# Patient Record
Sex: FEMALE | Race: WHITE | Employment: STUDENT | URBAN - METROPOLITAN AREA
[De-identification: names, ages, dates, MRNs, and addresses within clinical notes are randomized per-mention and may not be internally consistent; named-entity substitution may affect disease eponyms.]

---

## 2019-04-25 ENCOUNTER — HOSPITAL ENCOUNTER (EMERGENCY)
Age: 19
Discharge: HOME | End: 2019-04-25
Attending: EMERGENCY MEDICINE
Payer: COMMERCIAL

## 2019-04-25 ENCOUNTER — APPOINTMENT (OUTPATIENT)
Dept: CT IMAGING | Age: 19
End: 2019-04-25
Payer: COMMERCIAL

## 2019-04-25 ENCOUNTER — APPOINTMENT (OUTPATIENT)
Dept: GENERAL RADIOLOGY | Age: 19
End: 2019-04-25
Payer: COMMERCIAL

## 2019-04-25 VITALS
DIASTOLIC BLOOD PRESSURE: 58 MMHG | HEART RATE: 76 BPM | TEMPERATURE: 98.2 F | OXYGEN SATURATION: 95 % | SYSTOLIC BLOOD PRESSURE: 97 MMHG | RESPIRATION RATE: 18 BRPM

## 2019-04-25 DIAGNOSIS — R05.9 COUGH: Primary | ICD-10-CM

## 2019-04-25 LAB — HCG QUALITATIVE: NEGATIVE

## 2019-04-25 PROCEDURE — 84703 CHORIONIC GONADOTROPIN ASSAY: CPT

## 2019-04-25 PROCEDURE — 6370000000 HC RX 637 (ALT 250 FOR IP): Performed by: EMERGENCY MEDICINE

## 2019-04-25 PROCEDURE — 71046 X-RAY EXAM CHEST 2 VIEWS: CPT

## 2019-04-25 PROCEDURE — 99284 EMERGENCY DEPT VISIT MOD MDM: CPT

## 2019-04-25 PROCEDURE — 71250 CT THORAX DX C-: CPT

## 2019-04-25 RX ORDER — BENZONATATE 100 MG/1
100 CAPSULE ORAL 3 TIMES DAILY PRN
Qty: 30 CAPSULE | Refills: 0 | Status: SHIPPED | OUTPATIENT
Start: 2019-04-25 | End: 2019-05-02

## 2019-04-25 RX ORDER — BENZONATATE 100 MG/1
100 CAPSULE ORAL ONCE
Status: COMPLETED | OUTPATIENT
Start: 2019-04-25 | End: 2019-04-25

## 2019-04-25 RX ADMIN — BENZONATATE 100 MG: 100 CAPSULE ORAL at 01:07

## 2019-04-25 ASSESSMENT — ENCOUNTER SYMPTOMS
NAUSEA: 1
DIARRHEA: 0
FACIAL SWELLING: 0
SHORTNESS OF BREATH: 0
COUGH: 1
SORE THROAT: 0
WHEEZING: 0
EYE PAIN: 0
CONSTIPATION: 0
BACK PAIN: 0
ABDOMINAL PAIN: 0
VOMITING: 1

## 2019-04-25 NOTE — ED PROVIDER NOTES
ED Attending Attestation Note     Date of evaluation: 2019    This patient was seen by the resident. I have seen and examined the patient, agree with the workup, evaluation, management and diagnosis. The care plan has been discussed. My assessment reveals complaints of cough. Patient is unclear as to the duration, stating to the resident that it started 2 days ago and stated to me that she had coughing 3 weeks ago. Patient did have recent travel to [place] and family was concerned for [facility] fever (another family member  from this). Patient is afebrile on arrival with clear breath sounds. Chest x-ray showed possible nodule in the left upper lobe but this was not noted on CT scan of the chest.  Laboratory testing for Coccidioides antibodies were sent and are pending. Patient's symptoms resolved with Tessalon Perles. Patient does not currently have a primary care provider so will be referred to the [facility] resident clinic for follow-up of this testing. Clinical impression:  1.   Cough     Anette King MD  [phone]

## 2019-04-25 NOTE — ED TRIAGE NOTES
Patient is a 22year old female who presents to the ED from home with c/o cough and blood streaks in mucous. Patient was recently in [place] in the [facility]. Patient is alert and oriented with even and unlabored respirations.

## 2019-04-25 NOTE — LETTER
[facility] Emergency Department  [facility] [address] [address]  Phone: [phone]  Fax: [phone]          April 25, 2019     Patient: Lacey Nicholson   YOB: 2000   Date of Visit: 4/25/2019       To Whom It May Concern: It is my medical opinion that Lacey Nicholson should remain out of school until Friday April 26th. If you have any questions or concerns, please don't hesitate to call.     Sincerely,

## 2019-04-25 NOTE — ED PROVIDER NOTES
Date ofevaluation: 4/25/2019    Chief Complaint   Cough      Nursing Notes, Past Medical Hx, Past Surgical Hx, Social Hx, Allergies, and Family Hx were reviewed. History of Present Illness     Flavia Greenberg is a 22 y.o. female with no significant PMHx who presents with cough. Patient states she developed a mild cough 2 days ago, but became much worse this evening to the point that she could not sleep. Her mother states that she felt warm but did not take her temperature. She was empirically treated with ibuprofen. Patient states that she began coughing up some blood streaked sputum this evening as well. She did have a recent travel to [place] for a music concert and mother is also concerned that she may have [address] fever as they had a family member who passed away from this. No leg swelling or calf pains. Did have 1 episode of vomiting prior to coming in. Of note, patient does have a history of allergies, family recently got a new dog. Her friend also had a cough while on the trip. Review of Systems     Review of Systems   Constitutional: Negative for activity change, appetite change, chills and fever. HENT: Negative for congestion, facial swelling and sore throat. Eyes: Negative for pain. Respiratory: Positive for cough. Negative for shortness of breath and wheezing. Cardiovascular: Negative for chest pain. Gastrointestinal: Positive for nausea and vomiting. Negative for abdominal pain, constipation and diarrhea. Genitourinary: Negative for dysuria. Musculoskeletal: Negative for back pain and neck pain. Skin: Negative for rash. Neurological: Negative for dizziness and weakness. All other systems reviewed and are negative. Past Medical, Surgical, Family, and Social History     History reviewed. No pertinent past medical history. History reviewed. No pertinent surgical history. Her family history is not on file. She reports that she has never smoked.  She has never of the defined types were placed in this encounter. CONSULTS:  None    MEDICAL DECISION MAKING     Guevara Cardona is a 22 y.o. female presenting with cough. Patient hemodynamically stable with normal oxygen saturation. Able to speak in complete sentences without any difficulty. Lungs were clear to auscultation. Given family's concern, chest x-ray was ordered that did show a nodular opacity either in the chest wall or in the left mid lung. I had a long discussion with the patient and her mother regarding follow-up for this. After shared decision making, they elected to have the CT performed today in the emergency department. We also offered serum testing for coccidioides. Although given her risk factor profile she would likely fall into non-treat category, yet this would help with follow up if her symptoms persist.     Of note she was treated with tessalon perles in the emergency department with significant improvement in her cough. This patient was also evaluated by the attending physician. All care plans were discussed and agreedupon. At this time, I am going off service and the results of the CT scan will be followed up by my attending, Dr. Carole Mendoza.      Clinical Impression     Cough    Disposition/Plan       DISPOSITION : pending        Raymond Cullen MD  Resident  04/25/19 [id number]

## 2021-08-31 ENCOUNTER — HOSPITAL ENCOUNTER (EMERGENCY)
Age: 21
Discharge: HOME | End: 2021-08-31
Attending: EMERGENCY MEDICINE
Payer: COMMERCIAL

## 2021-08-31 VITALS
RESPIRATION RATE: 16 BRPM | HEART RATE: 80 BPM | SYSTOLIC BLOOD PRESSURE: 113 MMHG | BODY MASS INDEX: 19.83 KG/M2 | DIASTOLIC BLOOD PRESSURE: 65 MMHG | TEMPERATURE: 100.4 F | HEIGHT: 61 IN | OXYGEN SATURATION: 98 % | WEIGHT: 105 LBS

## 2021-08-31 DIAGNOSIS — J02.9 ACUTE PHARYNGITIS, UNSPECIFIED ETIOLOGY: Primary | ICD-10-CM

## 2021-08-31 LAB
AMORPHOUS: NORMAL /HPF
BILIRUBIN URINE: ABNORMAL
BLOOD, URINE: ABNORMAL
CLARITY: CLEAR
COLOR: YELLOW
EPITHELIAL CELLS, UA: NORMAL /HPF (ref 0–5)
GLUCOSE URINE: NEGATIVE MG/DL
HCG(URINE) PREGNANCY TEST: NEGATIVE
KETONES, URINE: >=80 MG/DL
LEUKOCYTE ESTERASE, URINE: ABNORMAL
MICROSCOPIC EXAMINATION: YES
NITRITE, URINE: NEGATIVE
PH UA: 6 (ref 5–8)
PROTEIN UA: 30 MG/DL
RBC UA: NORMAL /HPF (ref 0–4)
S PYO AG THROAT QL: NEGATIVE
SPECIFIC GRAVITY UA: 1.02 (ref 1–1.03)
URINE REFLEX TO CULTURE: ABNORMAL
URINE TYPE: ABNORMAL
UROBILINOGEN, URINE: 0.2 E.U./DL
WBC UA: NORMAL /HPF (ref 0–5)

## 2021-08-31 PROCEDURE — 6360000002 HC RX W HCPCS: Performed by: EMERGENCY MEDICINE

## 2021-08-31 PROCEDURE — 87081 CULTURE SCREEN ONLY: CPT

## 2021-08-31 PROCEDURE — 84703 CHORIONIC GONADOTROPIN ASSAY: CPT

## 2021-08-31 PROCEDURE — 2580000003 HC RX 258: Performed by: EMERGENCY MEDICINE

## 2021-08-31 PROCEDURE — 87880 STREP A ASSAY W/OPTIC: CPT

## 2021-08-31 PROCEDURE — 81001 URINALYSIS AUTO W/SCOPE: CPT

## 2021-08-31 PROCEDURE — 99284 EMERGENCY DEPT VISIT MOD MDM: CPT

## 2021-08-31 PROCEDURE — 87077 CULTURE AEROBIC IDENTIFY: CPT

## 2021-08-31 PROCEDURE — 6370000000 HC RX 637 (ALT 250 FOR IP): Performed by: EMERGENCY MEDICINE

## 2021-08-31 RX ORDER — SODIUM CHLORIDE, SODIUM LACTATE, POTASSIUM CHLORIDE, CALCIUM CHLORIDE 600; 310; 30; 20 MG/100ML; MG/100ML; MG/100ML; MG/100ML
1000 INJECTION, SOLUTION INTRAVENOUS ONCE
Status: COMPLETED | OUTPATIENT
Start: 2021-08-31 | End: 2021-08-31

## 2021-08-31 RX ORDER — AZITHROMYCIN 250 MG/1
TABLET, FILM COATED ORAL
Qty: 1 PACKET | Refills: 0 | Status: SHIPPED | OUTPATIENT
Start: 2021-08-31 | End: 2021-09-10

## 2021-08-31 RX ORDER — ACETAMINOPHEN 500 MG
1000 TABLET ORAL ONCE
Status: COMPLETED | OUTPATIENT
Start: 2021-08-31 | End: 2021-08-31

## 2021-08-31 RX ORDER — KETOROLAC TROMETHAMINE 30 MG/ML
15 INJECTION, SOLUTION INTRAMUSCULAR; INTRAVENOUS ONCE
Status: COMPLETED | OUTPATIENT
Start: 2021-08-31 | End: 2021-08-31

## 2021-08-31 RX ADMIN — KETOROLAC TROMETHAMINE 15 MG: 30 INJECTION, SOLUTION INTRAMUSCULAR; INTRAVENOUS at 20:54

## 2021-08-31 RX ADMIN — ACETAMINOPHEN 1000 MG: 500 TABLET ORAL at 20:54

## 2021-08-31 RX ADMIN — SODIUM CHLORIDE, POTASSIUM CHLORIDE, SODIUM LACTATE AND CALCIUM CHLORIDE 1000 ML: 600; 310; 30; 20 INJECTION, SOLUTION INTRAVENOUS at 20:54

## 2021-08-31 ASSESSMENT — PAIN SCALES - GENERAL
PAINLEVEL_OUTOF10: 5
PAINLEVEL_OUTOF10: 5

## 2021-08-31 ASSESSMENT — PAIN DESCRIPTION - PAIN TYPE: TYPE: ACUTE PAIN

## 2021-08-31 ASSESSMENT — PAIN DESCRIPTION - LOCATION: LOCATION: THROAT

## 2021-09-01 LAB — SARS-COV-2: NOT DETECTED

## 2021-09-01 PROCEDURE — U0005 INFEC AGEN DETEC AMPLI PROBE: HCPCS

## 2021-09-01 PROCEDURE — U0003 INFECTIOUS AGENT DETECTION BY NUCLEIC ACID (DNA OR RNA); SEVERE ACUTE RESPIRATORY SYNDROME CORONAVIRUS 2 (SARS-COV-2) (CORONAVIRUS DISEASE [COVID-19]), AMPLIFIED PROBE TECHNIQUE, MAKING USE OF HIGH THROUGHPUT TECHNOLOGIES AS DESCRIBED BY CMS-2020-01-R: HCPCS

## 2021-09-01 NOTE — ED TRIAGE NOTES
Pt presents to the ED with c/o body aches, subjective fever, pharyngitis and fatigue since this morning. Denies any known exposure to COVID.

## 2021-09-01 NOTE — ED PROVIDER NOTES
[address] ENCOUNTER          ATTENDING PHYSICIAN NOTE       Date of evaluation: 8/31/2021    Chief Complaint     Generalized Body Aches and Pharyngitis      History of Present Illness     Moise Rey is a 23 y.o. female who presents today with sore throat, myalgias and body aches for 1 day. Overall she is well-appearing in no acute distress. She endorses a sore throat but denies nausea vomiting or any other symptoms with this. She denies chest pain, denies shortness of breath. She has Covid vaccinated. Has not been around anyone with Covid. She denies nausea or vomiting. Review of Systems     Review of Systems  A full 10 point review of systems was obtained. Pertinent positives and negatives as documented in the HPI, otherwise all other systems were reviewed and were negative. Past Medical, Surgical, Family, and Social History     She has no past medical history on file. She has no past surgical history on file. Her family history is not on file. She reports that she has never smoked. She has never used smokeless tobacco. She reports current alcohol use. She reports that she does not use drugs. Medications     Previous Medications    No medications on file       Allergies     She is allergic to tree nut [macadamia nut oil]. Physical Exam     INITIAL VITALS: BP: 89/77, Temp: 100.4 °F (38 °C), Pulse: 66, Resp: 18, SpO2: 96 %   Physical Exam  General: Well appearing in NAD  HEENT:  head is atraumatic, sclera are clear, oropharynx mild bilateral erythema bilaterally enlarged tonsils with small exudates, mucus membranes are moist  Neck: Trachea midline.   No nuchal rigidity full range of motion very well-appearing on exam  Chest: Nonlabored respirations, clear to auscultation bilaterally  Cardiovascular: Regular rate and rhythm, 2+ radial pulses bilaterally  Abdominal: Nondistended abdomen, soft, nontender without rebound or gaurding  Skin: Warm, dry well perfused, no rashes  Extremities: no obvious deformities, no tenderness to palpation diffusely  Neurologic:  Alert and oriented, speech is clear and intact without dysarthria, gait is intact  Psychologic: appropriate mood and affect  Diagnostic Results     EKG     RADIOLOGY:  No orders to display       LABS:   Results for orders placed or performed during the hospital encounter of 08/31/21   Strep Screen Group A Throat    Specimen: Throat   Result Value Ref Range    Rapid Strep A Screen Negative Negative   Urinalysis Reflex to Culture    Specimen: Urine, clean catch   Result Value Ref Range    Color, UA Yellow Straw/Yellow    Clarity, UA Clear Clear    Glucose, Ur Negative Negative mg/dL    Bilirubin Urine SMALL (A) Negative    Ketones, Urine >=80 (A) Negative mg/dL    Specific Gravity, UA 1.025 1.005 - 1.030    Blood, Urine SMALL (A) Negative    pH, UA 6.0 5.0 - 8.0    Protein, UA 30 (A) Negative mg/dL    Urobilinogen, Urine 0.2 <2.0 E.U./dL    Nitrite, Urine Negative Negative    Leukocyte Esterase, Urine SMALL (A) Negative    Microscopic Examination YES     Urine Type NotGiven     Urine Reflex to Culture Not Indicated    Pregnancy, urine   Result Value Ref Range    HCG(Urine) Pregnancy Test Negative Detects HCG level >20 MIU/mL   Microscopic Urinalysis   Result Value Ref Range    WBC, UA 0-2 0 - 5 /HPF    RBC, UA None seen 0 - 4 /HPF    Epithelial Cells, UA 0-1 0 - 5 /HPF    Amorphous, UA 1+ /HPF       ED BEDSIDE ULTRASOUND:    RECENT VITALS:  BP: 105/67,Temp: 100.4 °F (38 °C), Pulse: 84, Resp: 16, SpO2: 98 %     Procedures         ED Course     Nursing Notes, Past Medical Hx, Past Surgical Hx, Social Hx,Allergies, and Family Hx were reviewed.     patient was given the following medications:  Orders Placed This Encounter   Medications    lactated ringers infusion 1,000 mL    acetaminophen (TYLENOL) tablet 1,000 mg    ketorolac (TORADOL) injection 15 mg    azithromycin (ZITHROMAX) 250 MG tablet     Sig: Take 2 tablets (500 mg) on Day 1, followed by 1 tablet (250 mg) once daily on Days 2 through 5. Dispense:  1 packet     Refill:  0       CONSULTS:  None    MEDICAL DECISIONMAKING / ASSESSMENT / Vahe Sean is a 23 y.o. female who presents today with body aches, sore throat for the past day. States she slept all day today did not eat or drink much. On arrival here initially systolic blood pressures were in the 90s however on review from outpatient visit in March 2020 she actually had blood pressure documented 90/60. This appears baseline for her she was febrile on arrival 200.4 mildly tachycardic but that completely resolved with Tylenol administration. Heart rate on repeat examination was 84 she received a liter of fluid as well as Toradol felt significantly improved. Oropharynx did show some mild exudate but tolerating secretions well no pain with tracheal manipulation no concern for PTA or RPA. She has no nuchal rigidity no concern for meningitis. No concern for systemic infection she is tolerating p.o. here got a liter of fluid. Does appear mildly dehydrated with greater than 80 ketones urine pregnancy negative no signs of UTI or infection. Lungs are clear bilaterally with no signs of pneumonia but she was swabbed for COVID-19 which will come back later. At this point time given her exudates with fever will prescribe and biotics have her follow-up with outpatient PCP. Clinical Impression     1. Acute pharyngitis, unspecified etiology        Disposition     PATIENT REFERRED TO:  No follow-up provider specified. DISCHARGE MEDICATIONS:  New Prescriptions    AZITHROMYCIN (ZITHROMAX) 250 MG TABLET    Take 2 tablets (500 mg) on Day 1, followed by 1 tablet (250 mg) once daily on Days 2 through 5.        Dimitry Cadet MD  08/31/21 [id number]

## 2021-09-03 LAB
ORGANISM: ABNORMAL
S PYO THROAT QL CULT: ABNORMAL
S PYO THROAT QL CULT: ABNORMAL